# Patient Record
Sex: MALE | Race: WHITE | NOT HISPANIC OR LATINO
[De-identification: names, ages, dates, MRNs, and addresses within clinical notes are randomized per-mention and may not be internally consistent; named-entity substitution may affect disease eponyms.]

---

## 2017-01-13 PROBLEM — Z00.00 ENCOUNTER FOR PREVENTIVE HEALTH EXAMINATION: Status: ACTIVE | Noted: 2017-01-13

## 2017-01-20 ENCOUNTER — APPOINTMENT (OUTPATIENT)
Dept: INTERNAL MEDICINE | Facility: CLINIC | Age: 32
End: 2017-01-20

## 2017-03-29 ENCOUNTER — OUTPATIENT (OUTPATIENT)
Dept: OUTPATIENT SERVICES | Facility: HOSPITAL | Age: 32
LOS: 1 days | Discharge: HOME | End: 2017-03-29

## 2017-06-27 DIAGNOSIS — R12 HEARTBURN: ICD-10-CM

## 2017-06-27 DIAGNOSIS — K31.7 POLYP OF STOMACH AND DUODENUM: ICD-10-CM

## 2017-06-27 DIAGNOSIS — K44.9 DIAPHRAGMATIC HERNIA WITHOUT OBSTRUCTION OR GANGRENE: ICD-10-CM

## 2017-06-27 DIAGNOSIS — K29.40 CHRONIC ATROPHIC GASTRITIS WITHOUT BLEEDING: ICD-10-CM

## 2017-06-27 DIAGNOSIS — K22.70 BARRETT'S ESOPHAGUS WITHOUT DYSPLASIA: ICD-10-CM

## 2017-07-31 ENCOUNTER — INPATIENT (INPATIENT)
Facility: HOSPITAL | Age: 32
LOS: 0 days | Discharge: HOME | End: 2017-08-01
Attending: EMERGENCY MEDICINE

## 2017-07-31 DIAGNOSIS — R07.89 OTHER CHEST PAIN: ICD-10-CM

## 2017-07-31 DIAGNOSIS — I10 ESSENTIAL (PRIMARY) HYPERTENSION: ICD-10-CM

## 2017-07-31 DIAGNOSIS — R07.9 CHEST PAIN, UNSPECIFIED: ICD-10-CM

## 2017-07-31 DIAGNOSIS — F17.210 NICOTINE DEPENDENCE, CIGARETTES, UNCOMPLICATED: ICD-10-CM

## 2017-07-31 DIAGNOSIS — K21.9 GASTRO-ESOPHAGEAL REFLUX DISEASE WITHOUT ESOPHAGITIS: ICD-10-CM

## 2017-07-31 DIAGNOSIS — Z82.49 FAMILY HISTORY OF ISCHEMIC HEART DISEASE AND OTHER DISEASES OF THE CIRCULATORY SYSTEM: ICD-10-CM

## 2017-07-31 DIAGNOSIS — R00.2 PALPITATIONS: ICD-10-CM

## 2017-08-30 ENCOUNTER — OUTPATIENT (OUTPATIENT)
Dept: OUTPATIENT SERVICES | Facility: HOSPITAL | Age: 32
LOS: 1 days | Discharge: HOME | End: 2017-08-30

## 2017-08-30 DIAGNOSIS — R07.9 CHEST PAIN, UNSPECIFIED: ICD-10-CM

## 2017-08-30 DIAGNOSIS — Z01.818 ENCOUNTER FOR OTHER PREPROCEDURAL EXAMINATION: ICD-10-CM

## 2017-08-30 DIAGNOSIS — K76.0 FATTY (CHANGE OF) LIVER, NOT ELSEWHERE CLASSIFIED: ICD-10-CM

## 2017-08-30 DIAGNOSIS — I10 ESSENTIAL (PRIMARY) HYPERTENSION: ICD-10-CM

## 2017-09-11 ENCOUNTER — OUTPATIENT (OUTPATIENT)
Dept: OUTPATIENT SERVICES | Facility: HOSPITAL | Age: 32
LOS: 1 days | Discharge: HOME | End: 2017-09-11

## 2017-09-11 DIAGNOSIS — R07.9 CHEST PAIN, UNSPECIFIED: ICD-10-CM

## 2017-09-26 DIAGNOSIS — R79.89 OTHER SPECIFIED ABNORMAL FINDINGS OF BLOOD CHEMISTRY: ICD-10-CM

## 2017-09-26 DIAGNOSIS — K76.0 FATTY (CHANGE OF) LIVER, NOT ELSEWHERE CLASSIFIED: ICD-10-CM

## 2020-02-26 ENCOUNTER — EMERGENCY (EMERGENCY)
Facility: HOSPITAL | Age: 35
LOS: 0 days | Discharge: HOME | End: 2020-02-26
Attending: EMERGENCY MEDICINE | Admitting: EMERGENCY MEDICINE
Payer: COMMERCIAL

## 2020-02-26 VITALS
WEIGHT: 274.92 LBS | DIASTOLIC BLOOD PRESSURE: 114 MMHG | SYSTOLIC BLOOD PRESSURE: 194 MMHG | OXYGEN SATURATION: 96 % | HEIGHT: 72 IN | TEMPERATURE: 98 F | RESPIRATION RATE: 18 BRPM | HEART RATE: 96 BPM

## 2020-02-26 DIAGNOSIS — R04.2 HEMOPTYSIS: ICD-10-CM

## 2020-02-26 DIAGNOSIS — R05 COUGH: ICD-10-CM

## 2020-02-26 DIAGNOSIS — F17.210 NICOTINE DEPENDENCE, CIGARETTES, UNCOMPLICATED: ICD-10-CM

## 2020-02-26 PROCEDURE — 99284 EMERGENCY DEPT VISIT MOD MDM: CPT

## 2020-02-26 PROCEDURE — 71046 X-RAY EXAM CHEST 2 VIEWS: CPT | Mod: 26

## 2020-02-26 NOTE — ED PROVIDER NOTE - PHYSICAL EXAMINATION
--EXAM--  VITAL SIGNS: I have reviewed vs documented at present.  CONSTITUTIONAL: Well-developed; well-nourished; in no acute distress.   SKIN: Warm and dry, no acute rash.   HEAD: Normocephalic; atraumatic.  EYES: PERRL, EOM intact; conjunctiva and sclera clear. No nystagmus.  ENT: No nasal discharge; airway clear.  NECK: Supple; non tender.  CARD: S1, S2, Regular rate and rhythm.   RESP: No wheezes, rales or rhonchi.

## 2020-02-26 NOTE — ED PROVIDER NOTE - NS ED ROS FT
Review of Systems:  	•	CONSTITUTIONAL - no fever, no diaphoresis, no chills  	•	SKIN - no rash  	•	HEMATOLOGIC - no bleeding, no bruising  	•	EYES - no eye pain, no blurry vision  	•	ENT - no change in hearing, no sore throat, no ear pain or tinnitus  	•	RESPIRATORY - no shortness of breath, postive cough  	•	CARDIAC - no chest pain, no palpitations  	•	GI - no abd pain, no nausea, no vomiting, no diarrhea, no constipation  	•	GENITO-URINARY - no discharge, no dysuria; no hematuria, no increased urinary frequency  	•	MUSCULOSKELETAL - no joint paint, no swelling, no redness  	•	NEUROLOGIC - no weakness, no headache, no paresthesias, no LOC  	•	PSYCH - no anxiety, non suicidal, non homicidal, no hallucination, no depression

## 2020-02-26 NOTE — ED PROVIDER NOTE - CLINICAL SUMMARY MEDICAL DECISION MAKING FREE TEXT BOX
CXR reviewed and results d/w pt and family.  Copies given.   Pt counseled on smoking cessation.  Pt has outpatient follow up and in my opinion outpatient work up is appropriate.  Pt instructed to return if any worsening symptoms or concerns.

## 2020-02-26 NOTE — ED PROVIDER NOTE - ATTENDING CONTRIBUTION TO CARE
35 yo M h/o sleep apnea, smoker presents with c/o coughing episode.  Pt states he coughed up some blood.  no pain, no fevers or chills, o night sweats, no weight loss.  + smoker. no travel. On exam pt in NAD AAO x 3, Op clear, MMM, Lungs cta b/l no wrr, abd soft nt nd, no edema,

## 2020-02-26 NOTE — ED PROVIDER NOTE - OBJECTIVE STATEMENT
this is 35 yo male presents to ed for evaluation of cough. patent states coughed while at work and he saw streaks of blood in his spit. patient denies any chest pain for shortness of breath.

## 2020-02-26 NOTE — ED PROVIDER NOTE - PATIENT PORTAL LINK FT
You can access the FollowMyHealth Patient Portal offered by [facility] by registering at the following website: [email or web address]. By joining [employer]’s FollowMyHealth portal, you will also be able to view your health information using other applications (apps) compatible with our system.

## 2020-11-19 ENCOUNTER — TRANSCRIPTION ENCOUNTER (OUTPATIENT)
Age: 35
End: 2020-11-19
